# Patient Record
Sex: FEMALE
[De-identification: names, ages, dates, MRNs, and addresses within clinical notes are randomized per-mention and may not be internally consistent; named-entity substitution may affect disease eponyms.]

---

## 2019-08-17 ENCOUNTER — HOSPITAL ENCOUNTER (EMERGENCY)
Dept: HOSPITAL 88 - ER | Age: 24
Discharge: HOME | End: 2019-08-17
Payer: COMMERCIAL

## 2019-08-17 VITALS — BODY MASS INDEX: 30.78 KG/M2 | HEIGHT: 70 IN | WEIGHT: 215 LBS

## 2019-08-17 VITALS — SYSTOLIC BLOOD PRESSURE: 122 MMHG | DIASTOLIC BLOOD PRESSURE: 74 MMHG
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
## 2019-08-17 DIAGNOSIS — S06.0X0A: Primary | ICD-10-CM
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
## 2019-08-17 DIAGNOSIS — S00.83XA: ICD-10-CM

## 2019-08-17 DIAGNOSIS — R51: ICD-10-CM

## 2019-08-17 PROCEDURE — 99283 EMERGENCY DEPT VISIT LOW MDM: CPT

## 2019-08-17 PROCEDURE — 70450 CT HEAD/BRAIN W/O DYE: CPT

## 2019-08-17 NOTE — DIAGNOSTIC IMAGING REPORT
CT BRAIN WO



HISTORY: Headache, trauma



COMPARISON:  None.



TECHNIQUE: 

Noncontrast axial scans were obtained from skull base to the vertex.  Coronal

and sagittal reconstructions obtained from the axial data.  One or more of the

following dose reduction techniques were used: Automated exposure control,

adjustment of the mA and/or kV according to patient size, and/or utilization of

iterative reconstruction technique.



DISCUSSION:



Scalp/Skull: Unremarkable.

Brain sulci: Appropriate for patient's age.

Ventricles: Normal in size and configuration.  No hydrocephalus.

Extra-axial spaces: No masses or fluid collections.     



Parenchyma: 

No abnormal densities.

No mass, hemorrhage, or large vascular territory acute infarct.



Dural sinuses:  No abnormal densities.

Sellar/Suprasellar region: Intact.

Skull base: Intact.

Incidental findings: None.



IMPRESSION:



No intracranial abnormalities.

   



Signed by: Dr. Dash Stephenson M.D. on 8/17/2019 11:02 PM